# Patient Record
Sex: FEMALE | Race: WHITE
[De-identification: names, ages, dates, MRNs, and addresses within clinical notes are randomized per-mention and may not be internally consistent; named-entity substitution may affect disease eponyms.]

---

## 2017-11-29 VITALS — DIASTOLIC BLOOD PRESSURE: 71 MMHG | SYSTOLIC BLOOD PRESSURE: 114 MMHG

## 2017-11-29 LAB — PLATELET COUNT, AUTOMATED: 164 K/UL (ref 150–450)

## 2017-11-29 NOTE — ONC PROGRESS NOTE - NP.HALSEY
Patient History


Date of Service


2017





Reason For Visit/HPI


Patient is seen in the clinic today for follow-up of her breast cancer. Patient 

is currently a stage IV according to PET/CT scan and was started on ibrance 

with her Femara by Dr. Alexander Tuttle at [facility]. Patient started 

Ibrance in July and reports that she has had to decrease the dose due to 

neutropenia.  She is currently on cycle 4 and has 1 week of treatment to 

complete this cycle. She has required 2 week break prior to each dose 

reduction. She has been out of state for the last 3 months and is back in town. 

She is currently on 75 mg daily 3 weeks on a 4 week cycle. She currently is 

looking for a breast specialist oncology provider. She is not sure she wants to 

travel to see Dr. Alexander Tuttle on a monthly basis especially with the winter 

months. She is in need of labs today and then we will follow monthly with labs. 

She has not had any further testing done recently.





Oncology History


The patient is a 66-year-old female who presented with a mass of the right 

breast.  Her mammogram and ultrasound showed an area suspicious in the right 

breast upper outer quadrant.  The patient ended by having bilateral mastectomy 

and right axillary lymph node biopsy done on 2016.  The pathology 

came back positive for 2 cm invasive lobular carcinoma of the right breast, 

grade III/III, with 8/8 lymph nodes coming back positive for metastasis.  ER/ND 

positive, HER2/tiffanie negative.  Ki-67 was 84%.  CA 27-29 was normal at 8.  The 

patient had an echocardiogram on 2016, which showed left 

ventricular ejection fraction of 55%.  MRI of the brain done on 2016 was negative for intracranial metastasis and PET scan done the same day 

showed multiple small, scattered sclerotic non-hypermetabolic bone lesions; 

metastatic disease could not be fully ruled out.  The patient has been 

evaluated by Dr. Caitlin Bush at [facility] with recommendation 

of treatment with dose-dense AC to be followed by dose-dense Taxol and patient 

will start adjuvant hormonal therapy at the same time of Taxol to be followed 

by radiation therapy versus inclusion in a clinical trial.  The patient started 

adjuvant dose-dense AC with Adriamycin and cyclophosphamide on 2016.


She started dose dense adjuvant Taxol therapy on 2017.  





The patient completed four cycles of dose-dense Taxol on 2017.  





Radiation therapy was completed between 2017 and 2017 to the right 

breast and axilla to a therapeutic dose of 6040 cGy.





Repeat PET scan indicates stage IV disease. Patient had a 2nd biopsy which was 

inconclusive according to her. Patient was started on ibrance early in July 

with concurrent Femara.  Patient has required dose reduction due to 

neutropenia. She is currently on 75 mg daily





Medical History


Family History:  


FH: HTN (hypertension)


  MOTHER, , Age:91


FH: MI (myocardial infarction)


  MOTHER, , Age:91


FH: breast cancer


  Aunt


  Cousin


FH: glaucoma


  MOTHER, , Age:91


FH: lung cancer


  MOTHER, , Age:91


FH: macular degeneration


  MOTHER, , Age:91


FH: prostate cancer


  Cousin





Psychosocial History


Social History


She is single


Occupational History


She is retired


Alcohol History


She denies abuse


Smoking History:  No


Smoking Status:  Never Smoker


Exposure to Second Hand Smoke?:  No





Medications and Allergies


Active Scripts


Letrozole (FEMARA) 2.5 Mg Tab, 2.5 MG GT DAILY, #30 TAB 9 Refills


   Prov:CAITLIN NICE FNP-BC, ONC         17


Reported Medications


Glucosamine Sulfate 2KCL (GLUCOSAMINE) 1,000 Mg Tablet, 1000 MG PO


   17


Palbociclib (Ibrance) 75 Mg Capsule


   17


Ubidecarenone (CO Q-10) 10 Mg Capsule, 10 MG PO DAILY, CAPSULE


   17


Cyanocobalamin (Vitamin B-12) (VITAMIN B-12) 1,000 Mcg Tablet, 1000 MCG PO DAILY


   17


Vitamin B Complex (B COMPLEX) 1 Each Tablet, 1 EACH PO DAILY


   17


Phytonadione (VITAMIN K) 100 Mcg Tablet, 100 MCG PO DAILY


   17


Calcium Citrate/Vitamin D3 (CALCIUM CITRATE - VIT D TABLET) 1 Each Tablet, 2 

TAB PO DAILY, TAB


   11/15/16


Cholecalciferol (Vitamin D3) (VITAMIN D3) 5,000 Unit Tablet, 2 TAB PO DAILY, TAB


   11/15/16


[Areds 2]   No Conflict Check, 2 TAB PO DAILY for Eye Health


   11/15/16


Discontinued Reported Medications


Melatonin/Pyridoxine (MELATONIN 3 MG TABLET) 1 Each Tablet, 1 TAB PO QHS Y for 

INSOMNIA, TAB


   11/15/16


Discontinued Scripts


Lorazepam (ATIVAN) 0.5 Mg Tablet, 0.5 MG PO Q4-6H Y for NAUSEA, #10 TAB


   Prov:CAITLIN NICE FNP-BC, ONC         16


Ondansetron Hcl (ZOFRAN) 8 Mg Tablet, 8 MG PO Q8H, #10 TAB


   Prov:CAITLIN NICE FNP-BC, ONC         16


Allergies:  


Coded Allergies:  


     No Known Drug Allergies (Verified , 13)





Review of System/Physical Exam


Review of Systems


All Systems Reviewed/Normal:  Yes, Except as Noted


Hematologic:  Positive for Fatigue





Physical Exam


Vital Signs





Temperature:         98.6 


Pulse:                    70 


BP Systolic:           114 


BP Diastolic:          71 


Respiratory Rate:   16 


O2 SAT:                94 


O2 Delivery:            





Height (inches)      62.00  


Weight lb:             113 


Weight oz:             


Weight Kg (Iftikhar):     





Pain:                    0


ECOG Score:  0


General:  Stable, Well Developed, Well Nourished, Not In Acute Distress


Lungs:  Clear to Auscultation


Heart:  Regular Rate, Regular Rhythm, No Gallops


Abdomen:  Other (bowel sounds active)


Extremities:  No Cyanosis, No Edema


Psychiatric:  Mood appears normal, Affect appears normal, Other (exam was 

deferred today to discuss current treatment and management of therapy.)





Diagnostic Studies


Diagnostic Studies


Laboratory


CBC, CMP and vitamin D drawn today








Item Value  Date Time


 


White Blood Count 2.1 k/uL L 17 1345


 


Red Blood Count 3.78 M/uL L 17 1345


 


Mean Corpuscular Volume 101.2 fL H 17 1345


 


Mean Corpuscular Hemoglobin 35.0 pg H 17 1345


 


Neutrophils # (Auto) 1.2 K/uL L 17 1345


 


Vitamin D 25-Hydroxy 74 ng/ml 17 1345


 


Sodium Level 137 mmol/L 17 1345


 


Potassium Level 4.0 mmol/L 17 1345


 


Chloride Level 102 mmol/L 17 1345


 


Carbon Dioxide Level 25 mmol/L 17 1345


 


Blood Urea Nitrogen 15 mg/dl 17 1345


 


Creatinine 0.90 mg/dl 17 1345


 


Glomerular Filtration Rate Calc > 60.0 17 1345


 


Random Glucose 79 mg/dl 17 1345


 


Calcium Level 9.4 mg/dl 17 1345


 


Total Bilirubin 0.7 mg/dl 17 1345


 


Aspartate Amino Transf (AST/SGOT) 33 U/L 17 1345


 


Alanine Aminotransferase (ALT/SGPT) 39 U/L 17 1345


 


Alkaline Phosphatase 49 U/L 17 1345


 


Total Protein 7.1 gm/dl 17 1345


 


Albumin 4.2 g/dl 17 1345











Assessment and Plan


Assessment & Plan





1. Initial staging with consult was a Stage III right invasive lobular 

carcinoma of the right breast status post bilateral mastectomy with right 

axillary lymph node dissection done 2016 for 2 cm invasive lobular 

carcinoma grade 2/3 with 8 out of 8 lymph nodes positive for metastasis, ER/ND 

positive, HER2/tiffanie negative, Ki-67 8.4%.  MRI of the brain done 2016 

was negative for intracranial metastases, and PET scan showed multiple 

sclerotic nonhypermetabolic bone lesions.  Echocardiogram 2016 did 

reveal normal left ventricular ejection fraction of 55%.  Patient has been 

evaluated by Dr. Caitlin Bush at [facility], who recommended 

dose dense AC to be followed by dose dense Taxol, and patient completed 4 

cycles of dose dense AC between 2016 through 2017.  

After that, she received dose dense Taxol for 4 cycles between 2017 

through 2017 with concurrent hormonal therapy with Femara.  She 

completed her radiation therapy.  Her PET scan was really positive, but it was 

thought may be due to her Neulasta shot, but her repeated scan was also 

suggestive of osseous metastasis by radiologist report, although it looks the 

same like the previous scan.  Patient was seen by clinical trial staff and was 

not eligible. She consulted with Dr. Alexander Tuttle and completed a second 

biopsy of the left hip which was inconclusive again as the previous.  She was 

started on Ibrance in 2017 with Femara. Patient has required a dose 

reduction due to neutropenia. She has been followed by  Dr. Alexander Tuttle. 

She is currently on cycle 4 of ibrance 75 mg dose week 3 today. She tentatively 

will start cycle 5 on . She will need a new prescription through 

[facility].











2.  Sclerotic bone lesions, negative by PET scan twice.  Her bone scan is still 

positive, which was done recently on Vidya 15, 2017. She has discussed the use 

of Xgeva with Dr. Tuttle but has not been started on it. 





3. Vitamin D deficiency. Patient's vitamin D has improved on 10,000 units 

daily. level is at 74. 





Patient to  lab results later today for her records and to share with 

Dr. Tabatha Tuttle if she follows with him again.  She is considering 

establishing care with Dr. England. 




















I personally spent a total of 30 minutes. Of that 30 minutes was counseling/

coordination of patient's care.  


See my note above for details.


Copies to:   BRIE AUSTIN DNP, FNP-BC











CAITLIN NICE FNP-BC, ONC 2017 13:20

## 2017-12-13 VITALS — DIASTOLIC BLOOD PRESSURE: 61 MMHG | SYSTOLIC BLOOD PRESSURE: 93 MMHG

## 2017-12-13 LAB — PLATELET COUNT, AUTOMATED: 138 K/UL (ref 150–450)

## 2017-12-13 NOTE — ONC PROGRESS NOTE - NP.HALSEY
Patient History


Date of Service


Dec 13, 2017





Reason For Visit/HPI


Patient is seen in the clinic today for follow-up of her breast cancer. Patient 

is currently a stage IV according to PET/CT scan and was started on ibrance 

with her Femara by Dr. Alexander Tuttle at [facility]. Patient started 

Ibrance in July and reports that she has had to decrease the dose due to 

neutropenia.  She has completed cycle 4 .  Her absolute neutrophil count is 700 

today so she will wait one additional week, redraw labs and possibly start.  

She has required 2 week break prior to each previously and the dose reduction.  

She is currently on 75 mg daily 3 weeks on a 4 week cycle. She currently is 

looking for a breast specialist oncology provider. She is not sure she wants to 

travel to see Dr. Alexander Tuttle on a monthly basis especially with the winter 

months. She also thinks that maybe she will go back to [facility] however does 

not want to see them on a monthly basis. Patient reports that she has had Xgeva 

which was initiated in September by Dr. Alexander Tuttle. She does not believe 

that she has had a 2nd Xgeva since that time.





Problem List





(1) Hypercholesteremia


(2) Post-menopausal


(3) Metastatic breast cancer


(4) Estrogen receptor positive


(5) Chemotherapy induced nausea and vomiting


(6) Invasive lobular carcinoma of breast, stage 3


(7) Breast cancer, right





Oncology History


The patient is a 66-year-old female who presented with a mass of the right 

breast.  Her mammogram and ultrasound showed an area suspicious in the right 

breast upper outer quadrant.  The patient ended by having bilateral mastectomy 

and right axillary lymph node biopsy done on 2016.  The pathology 

came back positive for 2 cm invasive lobular carcinoma of the right breast, 

grade III/III, with 8/8 lymph nodes coming back positive for metastasis.  ER/AZ 

positive, HER2/tiffanie negative.  Ki-67 was 84%.  CA 27-29 was normal at 8.  The 

patient had an echocardiogram on 2016, which showed left 

ventricular ejection fraction of 55%.  MRI of the brain done on 2016 was negative for intracranial metastasis and PET scan done the same day 

showed multiple small, scattered sclerotic non-hypermetabolic bone lesions; 

metastatic disease could not be fully ruled out.  The patient has been 

evaluated by Dr. Caitlin Bush at [facility] with recommendation 

of treatment with dose-dense AC to be followed by dose-dense Taxol and patient 

will start adjuvant hormonal therapy at the same time of Taxol to be followed 

by radiation therapy versus inclusion in a clinical trial.  The patient started 

adjuvant dose-dense AC with Adriamycin and cyclophosphamide on 2016.


She started dose dense adjuvant Taxol therapy on 2017.  





The patient completed four cycles of dose-dense Taxol on 2017.  





Radiation therapy was completed between 2017 and 2017 to the right 

breast and axilla to a therapeutic dose of 6040 cGy.





Repeat PET scan indicates stage IV disease. Patient had a 2nd biopsy which was 

inconclusive according to her. Patient was started on ibrance early in July 

with concurrent Femara.  Patient has required dose reduction due to 

neutropenia. She is currently on 75 mg daily





Patient reports that Dr. Alexander Tuttle started her on Xgeva 120 mg in 

September but she has not had a 2nd dose due to her traveling. I will 

reinitiate this today





Medical History


Family History:  


FH: HTN (hypertension)


  MOTHER, , Age:91


FH: MI (myocardial infarction)


  MOTHER, , Age:91


FH: breast cancer


  Aunt


  Cousin


FH: glaucoma


  MOTHER, , Age:91


FH: lung cancer


  MOTHER, , Age:91


FH: macular degeneration


  MOTHER, , Age:91


FH: prostate cancer


  Cousin





Psychosocial History


Social History


She is single


Occupational History


She is retired


Alcohol History


She denies abuse


Smoking History:  No


Smoking Status:  Never Smoker


Exposure to Second Hand Smoke?:  No





Medications and Allergies


Active Scripts


Letrozole (FEMARA) 2.5 Mg Tab, 2.5 MG GT DAILY, #30 TAB 9 Refills


   Prov:CAITLIN NICE FNP-BC, ONC         17


Reported Medications


[tumeric]   No Conflict Check, PO


   17


Glucosamine Sulfate 2KCL (GLUCOSAMINE) 1,000 Mg Tablet, 1000 MG PO


   17


Palbociclib (Ibrance) 75 Mg Capsule


   17


Ubidecarenone (CO Q-10) 10 Mg Capsule, 10 MG PO DAILY, CAPSULE


   17


Cyanocobalamin (Vitamin B-12) (VITAMIN B-12) 1,000 Mcg Tablet, 1000 MCG PO DAILY


   17


Vitamin B Complex (B COMPLEX) 1 Each Tablet, 1 EACH PO DAILY


   17


Phytonadione (VITAMIN K) 100 Mcg Tablet, 100 MCG PO DAILY


   17


Calcium Citrate/Vitamin D3 (CALCIUM CITRATE - VIT D TABLET) 1 Each Tablet, 2 

TAB PO DAILY, TAB


   11/15/16


Cholecalciferol (Vitamin D3) (VITAMIN D3) 5,000 Unit Tablet, 2 TAB PO DAILY, TAB


   11/15/16


[Areds 2]   No Conflict Check, 2 TAB PO DAILY for Eye Health


   11/15/16


Allergies:  


Coded Allergies:  


     No Known Drug Allergies (Verified , 13)





Review of System/Physical Exam


Review of Systems


All Systems Reviewed/Normal:  Yes, Except as Noted


Hematologic:  Positive for Fatigue


Psychiatric:  Depression (patient is very angry and frustrated with her current 

diagnosis and does not see a positive side most days regarding this diagnosis.)





Physical Exam


Vital Signs





Temperature:         98.9 


Pulse:                    70 


BP Systolic:           93 


BP Diastolic:          61 


Respiratory Rate:   16 


O2 SAT:                95 


O2 Delivery:            





Height (inches)      62.00  


Weight lb:             113 


Weight oz:             


Weight Kg (Iftikhar):     





Pain:                    0


ECOG Score:  0


General:  Stable, Well Developed, Well Nourished, Not In Acute Distress


Heart:  Regular Rate, Regular Rhythm, No Gallops


Abdomen:  Soft and Nontender, No Hepatosplenomegaly, No Masses


Psychiatric:  Mood appears normal, Affect appears normal (normal regarding 

patient's current diagnosis.)





Diagnostic Studies


Diagnostic Studies


Laboratory


 Laboratory Tests


17 09:00








Laboratory Tests


17 13:45: Vitamin D 25-Hydroxy 74


17 09:00: 


White Blood Count 1.7, Red Blood Count 3.74, Hemoglobin 13.0, Hematocrit 37.5, 

Mean Corpuscular Volume 100.3, Mean Corpuscular Hemoglobin 34.9, Mean 

Corpuscular Hemoglobin Concent 34.8, Red Cell Distribution Width 14.7, Platelet 

Count 138, Mean Platelet Volume 7.8, Neutrophils (%) (Auto) 40.6, Lymphocytes (%

) (Auto) 31.4, Monocytes (%) (Auto) 21.5, Eosinophils (%) (Auto) 4.9, Basophils 

(%) (Auto) 1.6, Nucleated RBC Relative Count (auto) 0.1, Neutrophils # (Auto) 

0.7, Lymphocytes # (Auto) 0.5, Monocytes # (Auto) 0.4, Eosinophils # (Auto) 0.1

, Basophils # (Auto) 0.0, Nucleated RBC Absolute Count (auto) 0.00, Peripheral 

Blood Smear Yes, Sodium Level 140, Potassium Level 4.3, Chloride Level 107, 

Carbon Dioxide Level 26, Blood Urea Nitrogen 14, Creatinine 0.70, Glomerular 

Filtration Rate Calc > 60.0, Random Glucose 87, Calcium Level 8.7, Total 

Bilirubin 0.3, Aspartate Amino Transf (AST/SGOT) 32, Alanine Aminotransferase (

ALT/SGPT) 35, Alkaline Phosphatase 40, Total Protein 6.6, Albumin 3.8





Assessment and Plan


Assessment & Plan





1. Initial staging with consult was a Stage III right invasive lobular 

carcinoma of the right breast status post bilateral mastectomy with right 

axillary lymph node dissection done 2016 for 2 cm invasive lobular 

carcinoma grade 2/3 with 8 out of 8 lymph nodes positive for metastasis, ER/AZ 

positive, HER2/tiffanie negative, Ki-67 8.4%.  MRI of the brain done 2016 

was negative for intracranial metastases, and PET scan showed multiple 

sclerotic nonhypermetabolic bone lesions.  Echocardiogram 2016 did 

reveal normal left ventricular ejection fraction of 55%.  Patient has been 

evaluated by Dr. Caitlin Bush at [facility], who recommended 

dose dense AC to be followed by dose dense Taxol, and patient completed 4 

cycles of dose dense AC between 2016 through 2017.  

After that, she received dose dense Taxol for 4 cycles between 2017 

through 2017 with concurrent hormonal therapy with Femara.  She 

completed her radiation therapy.  Her PET scan was really positive, but it was 

thought may be due to her Neulasta shot, but her repeated scan was also 

suggestive of osseous metastasis by radiologist report, although it looks the 

same like the previous scan.  Patient was seen by clinical trial staff and was 

not eligible. She consulted with Dr. Alexander Tuttle and completed a second 

biopsy of the left hip which was inconclusive again as the previous.  She was 

started on Ibrance in 2017 with Femara. Patient has required a dose 

reduction due to neutropenia. She has been followed by  Dr. Alexander Tuttle. 

She has just completed cycle 4 of ibrance 75 mg dose week. Her ANC is 700 today 

so I will hold an additional week and repeat labs. If ANC is 1000 or greater 

she will restart Ibrance next week. She tentatively will start cycle 5 on 

 at the 75 mg dose. Patient also reports that she was started on 

Xgeva 120 mg in September and has not had a 2nd dose. I will restart this on a 

monthly basis next week. Patient will also have a PET/CT scan completed in 

January prior to starting cycle 6 of current treatment for evaluation.





Patient is encouraged to visit with [facility] and see if they would continue 

to monitor her care on a 3-4 month basis. She may follow with Dr. Zaidi in 

the [place] clinic. She may seek care by a new provider. She is not sure if she 

wants to continue following with Dr. Alexander Tuttle as he is a research 

specialist. He is not currently on a clinical trial.








2.  Sclerotic bone lesions, negative by PET scan twice.  Her bone scan is still 

positive, which was done recently on Vidya 15, 2017.  I will repeat a PET CT 

scan in January.  





3. Vitamin D deficiency. Patient's vitamin D has improved on 10,000 units 

daily. level is at 74. 




















I personally spent a total of 30 minutes. Of that 30 minutes was counseling/

coordination of patient's care.  


See my note above for details.











CAITLIN NICE FNP-BC, ONC Dec 13, 2017 12:04

## 2017-12-20 LAB — PLATELET COUNT, AUTOMATED: 204 K/UL (ref 150–450)

## 2017-12-22 ENCOUNTER — HOSPITAL ENCOUNTER (OUTPATIENT)
Dept: HOSPITAL 89 - SPU | Age: 67
LOS: 28 days | Discharge: HOME | End: 2018-01-19
Attending: NURSE PRACTITIONER
Payer: MEDICARE

## 2017-12-22 VITALS — WEIGHT: 114.79 LBS | BODY MASS INDEX: 21.12 KG/M2 | HEIGHT: 62 IN

## 2017-12-22 DIAGNOSIS — C50.911: Primary | ICD-10-CM

## 2017-12-22 DIAGNOSIS — C77.9: ICD-10-CM

## 2017-12-22 DIAGNOSIS — R11.2: ICD-10-CM

## 2017-12-22 DIAGNOSIS — Z92.3: ICD-10-CM

## 2017-12-22 DIAGNOSIS — M89.9: ICD-10-CM

## 2017-12-22 DIAGNOSIS — E55.9: ICD-10-CM

## 2017-12-22 DIAGNOSIS — Z17.0: ICD-10-CM

## 2017-12-22 DIAGNOSIS — Z78.0: ICD-10-CM

## 2017-12-22 DIAGNOSIS — R53.83: ICD-10-CM

## 2017-12-22 DIAGNOSIS — Z79.899: ICD-10-CM

## 2017-12-22 DIAGNOSIS — E78.00: ICD-10-CM

## 2017-12-22 DIAGNOSIS — Z92.21: ICD-10-CM

## 2017-12-22 LAB — PLATELET COUNT, AUTOMATED: 227 K/UL (ref 150–450)

## 2017-12-22 PROCEDURE — 83735 ASSAY OF MAGNESIUM: CPT

## 2017-12-22 PROCEDURE — 82040 ASSAY OF SERUM ALBUMIN: CPT

## 2017-12-22 PROCEDURE — 99212 OFFICE O/P EST SF 10 MIN: CPT

## 2017-12-22 PROCEDURE — 84460 ALANINE AMINO (ALT) (SGPT): CPT

## 2017-12-22 PROCEDURE — 36415 COLL VENOUS BLD VENIPUNCTURE: CPT

## 2017-12-22 PROCEDURE — 82310 ASSAY OF CALCIUM: CPT

## 2017-12-22 PROCEDURE — 84295 ASSAY OF SERUM SODIUM: CPT

## 2017-12-22 PROCEDURE — 84100 ASSAY OF PHOSPHORUS: CPT

## 2017-12-22 PROCEDURE — 84520 ASSAY OF UREA NITROGEN: CPT

## 2017-12-22 PROCEDURE — 86300 IMMUNOASSAY TUMOR CA 15-3: CPT

## 2017-12-22 PROCEDURE — 84450 TRANSFERASE (AST) (SGOT): CPT

## 2017-12-22 PROCEDURE — 96372 THER/PROPH/DIAG INJ SC/IM: CPT

## 2017-12-22 PROCEDURE — 82374 ASSAY BLOOD CARBON DIOXIDE: CPT

## 2017-12-22 PROCEDURE — 84132 ASSAY OF SERUM POTASSIUM: CPT

## 2017-12-22 PROCEDURE — 86304 IMMUNOASSAY TUMOR CA 125: CPT

## 2017-12-22 PROCEDURE — 82565 ASSAY OF CREATININE: CPT

## 2017-12-22 PROCEDURE — 82947 ASSAY GLUCOSE BLOOD QUANT: CPT

## 2017-12-22 PROCEDURE — 84075 ASSAY ALKALINE PHOSPHATASE: CPT

## 2017-12-22 PROCEDURE — 84155 ASSAY OF PROTEIN SERUM: CPT

## 2017-12-22 PROCEDURE — 82306 VITAMIN D 25 HYDROXY: CPT

## 2017-12-22 PROCEDURE — 82247 BILIRUBIN TOTAL: CPT

## 2017-12-22 PROCEDURE — 85025 COMPLETE CBC W/AUTO DIFF WBC: CPT

## 2017-12-22 PROCEDURE — 82378 CARCINOEMBRYONIC ANTIGEN: CPT

## 2017-12-22 PROCEDURE — 82435 ASSAY OF BLOOD CHLORIDE: CPT

## 2018-01-18 LAB — PLATELET COUNT, AUTOMATED: 146 K/UL (ref 150–450)

## 2018-01-24 VITALS — DIASTOLIC BLOOD PRESSURE: 80 MMHG | SYSTOLIC BLOOD PRESSURE: 132 MMHG

## 2018-02-15 ENCOUNTER — HOSPITAL ENCOUNTER (OUTPATIENT)
Dept: HOSPITAL 89 - SPU | Age: 68
LOS: 6 days | Discharge: HOME | End: 2018-02-21
Attending: NURSE PRACTITIONER
Payer: MEDICARE

## 2018-02-15 VITALS — DIASTOLIC BLOOD PRESSURE: 63 MMHG | SYSTOLIC BLOOD PRESSURE: 99 MMHG

## 2018-02-15 DIAGNOSIS — C50.911: Primary | ICD-10-CM

## 2018-02-15 LAB — PLATELET COUNT, AUTOMATED: 149 K/UL (ref 150–450)

## 2018-02-15 PROCEDURE — 82247 BILIRUBIN TOTAL: CPT

## 2018-02-15 PROCEDURE — 84450 TRANSFERASE (AST) (SGOT): CPT

## 2018-02-15 PROCEDURE — 84520 ASSAY OF UREA NITROGEN: CPT

## 2018-02-15 PROCEDURE — 84155 ASSAY OF PROTEIN SERUM: CPT

## 2018-02-15 PROCEDURE — 82947 ASSAY GLUCOSE BLOOD QUANT: CPT

## 2018-02-15 PROCEDURE — 96372 THER/PROPH/DIAG INJ SC/IM: CPT

## 2018-02-15 PROCEDURE — 82435 ASSAY OF BLOOD CHLORIDE: CPT

## 2018-02-15 PROCEDURE — 84132 ASSAY OF SERUM POTASSIUM: CPT

## 2018-02-15 PROCEDURE — 85025 COMPLETE CBC W/AUTO DIFF WBC: CPT

## 2018-02-15 PROCEDURE — 82374 ASSAY BLOOD CARBON DIOXIDE: CPT

## 2018-02-15 PROCEDURE — 82040 ASSAY OF SERUM ALBUMIN: CPT

## 2018-02-15 PROCEDURE — 84295 ASSAY OF SERUM SODIUM: CPT

## 2018-02-15 PROCEDURE — 84075 ASSAY ALKALINE PHOSPHATASE: CPT

## 2018-02-15 PROCEDURE — 36415 COLL VENOUS BLD VENIPUNCTURE: CPT

## 2018-02-15 PROCEDURE — 82310 ASSAY OF CALCIUM: CPT

## 2018-02-15 PROCEDURE — 82565 ASSAY OF CREATININE: CPT

## 2018-02-15 PROCEDURE — 84460 ALANINE AMINO (ALT) (SGPT): CPT

## 2018-03-01 ENCOUNTER — HOSPITAL ENCOUNTER (OUTPATIENT)
Dept: HOSPITAL 89 - OR | Age: 68
Discharge: HOME | End: 2018-03-01
Attending: SURGERY
Payer: MEDICARE

## 2018-03-01 VITALS — SYSTOLIC BLOOD PRESSURE: 109 MMHG | DIASTOLIC BLOOD PRESSURE: 74 MMHG

## 2018-03-01 VITALS — DIASTOLIC BLOOD PRESSURE: 83 MMHG | SYSTOLIC BLOOD PRESSURE: 107 MMHG

## 2018-03-01 VITALS — SYSTOLIC BLOOD PRESSURE: 99 MMHG | DIASTOLIC BLOOD PRESSURE: 75 MMHG

## 2018-03-01 VITALS — DIASTOLIC BLOOD PRESSURE: 76 MMHG | SYSTOLIC BLOOD PRESSURE: 108 MMHG

## 2018-03-01 VITALS — WEIGHT: 110 LBS | HEIGHT: 62 IN | BODY MASS INDEX: 20.24 KG/M2

## 2018-03-01 VITALS — DIASTOLIC BLOOD PRESSURE: 56 MMHG | SYSTOLIC BLOOD PRESSURE: 86 MMHG

## 2018-03-01 VITALS — SYSTOLIC BLOOD PRESSURE: 90 MMHG | DIASTOLIC BLOOD PRESSURE: 61 MMHG

## 2018-03-01 VITALS — DIASTOLIC BLOOD PRESSURE: 67 MMHG | SYSTOLIC BLOOD PRESSURE: 101 MMHG

## 2018-03-01 DIAGNOSIS — Z12.11: Primary | ICD-10-CM

## 2018-03-01 DIAGNOSIS — K57.30: ICD-10-CM

## 2018-03-01 PROCEDURE — 00812 ANES LWR INTST SCR COLSC: CPT

## 2018-03-01 NOTE — POST OPERATIVE PROGRESS NOTE
Post Operative Progress Note


Date:  Mar 1, 2018


Time:  08:49


Surgeon:  


helena


Anesthesia:  


dr dos santos


Pre-Op Diagnosis:  


personal history of polyps


Post-Op Diagnosis:  


sigmoid diverticulosis


Procedure(s):  


colonoscopy











CALLY HIDALGO MD Mar 1, 2018 06:29

## 2018-03-01 NOTE — OPERATIVE REPORT 1
EVENT DATE:  March 1, 2018

SURGEON:  Bashir Ralph MD

ANESTHESIOLOGIST:  Gio Rooney MD

ANESTHESIA:  Sedation.





PREOPERATIVE DIAGNOSIS  

Personal history of polyps.



POSTOPERATIVE DIAGNOSIS 

Diverticulosis.



DESCRIPTION OF PROCEDURE 

The patient was placed in the left lateral decubitus position and given 
intravenous sedation.  The rectal exam was unremarkable.  A flexible 
colonoscope was inserted and advanced to the cecum.  She had an excellent bowel 
prep.  Ileocecal valve and the terminal ileum were identified.  The scope was 
slowly withdrawn.  Care was taken to look behind the haustral folds.  No 
abnormalities were noted in the cecum, right colon, or transverse colon.  In 
the descending colon and sigmoid colon, she had several diverticula.  No 
evidence of diverticulitis.  The rectum was normal.  The scope was retroflexed.
  That appeared to be normal.  



The patient will require a repeat colonoscopy in five years because of her 
history of polyps.  
MTDLEIA

## 2018-03-01 NOTE — SHORT(OUTPT) DISCHARGE SUMMARY
Discharge Summary


Reason for Hosp/Final Diag:  


(1) Encounter for colonoscopy due to history of adenomatous colonic polyps


Hospital Course & Plan:  sigmoid diverticulosis





Departure


Discharge to:  Home





Discharge Instructions


Home Meds


Active Scripts


Letrozole (FEMARA) 2.5 Mg Tab, 2.5 MG GT DAILY, #30 TAB 9 Refills


   Prov:ROMÁN NICE DARLING FNP-BC, ONC         1/23/17


Reported Medications


Lutein (LUTEIN) 20 Mg Tablet, 20 MG PO


   2/19/18


[tumeric]   No Conflict Check, PO


   12/13/17


Glucosamine Sulfate 2KCL (GLUCOSAMINE) 1,000 Mg Tablet, 1000 MG PO


   11/29/17


Palbociclib (Ibrance) 75 Mg Capsule


   11/29/17


Ubidecarenone (CO Q-10) 10 Mg Capsule, 10 MG PO DAILY, CAPSULE


   2/16/17


Cyanocobalamin (Vitamin B-12) (VITAMIN B-12) 1,000 Mcg Tablet, 1000 MCG PO DAILY


   2/16/17


Vitamin B Complex (B COMPLEX) 1 Each Tablet, 1 EACH PO DAILY


   2/16/17


Phytonadione (VITAMIN K) 100 Mcg Tablet, 100 MCG PO DAILY


   2/16/17


Calcium Citrate/Vitamin D3 (CALCIUM CITRATE - VIT D TABLET) 1 Each Tablet, 2 

TAB PO DAILY, TAB


   11/15/16


Cholecalciferol (Vitamin D3) (VITAMIN D3) 5,000 Unit Tablet, 2 TAB PO DAILY, TAB


   11/15/16


Diet:  High Fiber


Activity:  As Tolerated











CALLY HIDALGO MD Mar 1, 2018 06:30

## 2018-03-19 ENCOUNTER — HOSPITAL ENCOUNTER (OUTPATIENT)
Dept: HOSPITAL 89 - SPU | Age: 68
End: 2018-03-19
Attending: INTERNAL MEDICINE
Payer: MEDICARE

## 2018-03-19 VITALS — SYSTOLIC BLOOD PRESSURE: 105 MMHG | DIASTOLIC BLOOD PRESSURE: 69 MMHG

## 2018-03-19 DIAGNOSIS — C79.51: ICD-10-CM

## 2018-03-19 DIAGNOSIS — C50.411: Primary | ICD-10-CM

## 2018-03-19 DIAGNOSIS — Z17.0: ICD-10-CM

## 2018-03-19 LAB — PLATELET COUNT, AUTOMATED: 111 K/UL (ref 150–450)

## 2018-03-19 PROCEDURE — 36415 COLL VENOUS BLD VENIPUNCTURE: CPT

## 2018-04-20 ENCOUNTER — HOSPITAL ENCOUNTER (OUTPATIENT)
Dept: HOSPITAL 89 - SPU | Age: 68
LOS: 7 days | Discharge: HOME | End: 2018-04-27
Attending: NURSE PRACTITIONER
Payer: MEDICARE

## 2018-04-20 ENCOUNTER — HOSPITAL ENCOUNTER (OUTPATIENT)
Dept: HOSPITAL 89 - SPU | Age: 68
End: 2018-04-20
Attending: NURSE PRACTITIONER
Payer: MEDICARE

## 2018-04-20 VITALS — DIASTOLIC BLOOD PRESSURE: 61 MMHG | SYSTOLIC BLOOD PRESSURE: 91 MMHG

## 2018-04-20 DIAGNOSIS — C50.911: Primary | ICD-10-CM

## 2018-04-20 LAB — PLATELET COUNT, AUTOMATED: 132 K/UL (ref 150–450)

## 2018-04-20 PROCEDURE — 82040 ASSAY OF SERUM ALBUMIN: CPT

## 2018-04-20 PROCEDURE — 82247 BILIRUBIN TOTAL: CPT

## 2018-04-20 PROCEDURE — 84155 ASSAY OF PROTEIN SERUM: CPT

## 2018-04-20 PROCEDURE — 84450 TRANSFERASE (AST) (SGOT): CPT

## 2018-04-20 PROCEDURE — 82565 ASSAY OF CREATININE: CPT

## 2018-04-20 PROCEDURE — 82947 ASSAY GLUCOSE BLOOD QUANT: CPT

## 2018-04-20 PROCEDURE — 84295 ASSAY OF SERUM SODIUM: CPT

## 2018-04-20 PROCEDURE — 36415 COLL VENOUS BLD VENIPUNCTURE: CPT

## 2018-04-20 PROCEDURE — 82378 CARCINOEMBRYONIC ANTIGEN: CPT

## 2018-04-20 PROCEDURE — 82310 ASSAY OF CALCIUM: CPT

## 2018-04-20 PROCEDURE — 83735 ASSAY OF MAGNESIUM: CPT

## 2018-04-20 PROCEDURE — 86300 IMMUNOASSAY TUMOR CA 15-3: CPT

## 2018-04-20 PROCEDURE — 82435 ASSAY OF BLOOD CHLORIDE: CPT

## 2018-04-20 PROCEDURE — 84460 ALANINE AMINO (ALT) (SGPT): CPT

## 2018-04-20 PROCEDURE — 86304 IMMUNOASSAY TUMOR CA 125: CPT

## 2018-04-20 PROCEDURE — 82374 ASSAY BLOOD CARBON DIOXIDE: CPT

## 2018-04-20 PROCEDURE — 84132 ASSAY OF SERUM POTASSIUM: CPT

## 2018-04-20 PROCEDURE — 85025 COMPLETE CBC W/AUTO DIFF WBC: CPT

## 2018-04-20 PROCEDURE — 84520 ASSAY OF UREA NITROGEN: CPT

## 2018-04-20 PROCEDURE — 84075 ASSAY ALKALINE PHOSPHATASE: CPT

## 2018-04-20 PROCEDURE — 84100 ASSAY OF PHOSPHORUS: CPT

## 2018-04-20 PROCEDURE — 96372 THER/PROPH/DIAG INJ SC/IM: CPT

## 2018-08-09 ENCOUNTER — HOSPITAL ENCOUNTER (OUTPATIENT)
Dept: HOSPITAL 89 - SPU | Age: 68
LOS: 22 days | Discharge: HOME | End: 2018-08-31
Attending: INTERNAL MEDICINE
Payer: MEDICARE

## 2018-08-09 VITALS — SYSTOLIC BLOOD PRESSURE: 97 MMHG | DIASTOLIC BLOOD PRESSURE: 69 MMHG

## 2018-08-09 DIAGNOSIS — Z17.0: ICD-10-CM

## 2018-08-09 DIAGNOSIS — C79.51: ICD-10-CM

## 2018-08-09 DIAGNOSIS — C50.411: Primary | ICD-10-CM

## 2018-08-09 DIAGNOSIS — E83.42: ICD-10-CM

## 2018-08-09 LAB — PLATELET COUNT, AUTOMATED: 124 K/UL (ref 150–450)

## 2018-08-09 PROCEDURE — 84450 TRANSFERASE (AST) (SGOT): CPT

## 2018-08-09 PROCEDURE — 84132 ASSAY OF SERUM POTASSIUM: CPT

## 2018-08-09 PROCEDURE — 84155 ASSAY OF PROTEIN SERUM: CPT

## 2018-08-09 PROCEDURE — 84520 ASSAY OF UREA NITROGEN: CPT

## 2018-08-09 PROCEDURE — 82374 ASSAY BLOOD CARBON DIOXIDE: CPT

## 2018-08-09 PROCEDURE — 82565 ASSAY OF CREATININE: CPT

## 2018-08-09 PROCEDURE — 84100 ASSAY OF PHOSPHORUS: CPT

## 2018-08-09 PROCEDURE — 36415 COLL VENOUS BLD VENIPUNCTURE: CPT

## 2018-08-09 PROCEDURE — 84460 ALANINE AMINO (ALT) (SGPT): CPT

## 2018-08-09 PROCEDURE — 84075 ASSAY ALKALINE PHOSPHATASE: CPT

## 2018-08-09 PROCEDURE — 82040 ASSAY OF SERUM ALBUMIN: CPT

## 2018-08-09 PROCEDURE — 82947 ASSAY GLUCOSE BLOOD QUANT: CPT

## 2018-08-09 PROCEDURE — 82247 BILIRUBIN TOTAL: CPT

## 2018-08-09 PROCEDURE — 82435 ASSAY OF BLOOD CHLORIDE: CPT

## 2018-08-09 PROCEDURE — 85025 COMPLETE CBC W/AUTO DIFF WBC: CPT

## 2018-08-09 PROCEDURE — 83735 ASSAY OF MAGNESIUM: CPT

## 2018-08-09 PROCEDURE — 84295 ASSAY OF SERUM SODIUM: CPT

## 2018-08-09 PROCEDURE — 82310 ASSAY OF CALCIUM: CPT

## 2018-09-05 ENCOUNTER — HOSPITAL ENCOUNTER (OUTPATIENT)
Dept: HOSPITAL 89 - SPU | Age: 68
LOS: 21 days | Discharge: HOME | End: 2018-09-26
Attending: INTERNAL MEDICINE
Payer: MEDICARE

## 2018-09-05 VITALS — DIASTOLIC BLOOD PRESSURE: 68 MMHG | SYSTOLIC BLOOD PRESSURE: 101 MMHG

## 2018-09-05 DIAGNOSIS — Z17.0: ICD-10-CM

## 2018-09-05 DIAGNOSIS — C79.51: ICD-10-CM

## 2018-09-05 DIAGNOSIS — C50.411: Primary | ICD-10-CM

## 2018-09-05 PROCEDURE — 96372 THER/PROPH/DIAG INJ SC/IM: CPT

## 2018-09-07 ENCOUNTER — HOSPITAL ENCOUNTER (OUTPATIENT)
Dept: HOSPITAL 89 - SPU | Age: 68
End: 2018-09-07
Attending: INTERNAL MEDICINE
Payer: MEDICARE

## 2018-09-07 VITALS — DIASTOLIC BLOOD PRESSURE: 64 MMHG | SYSTOLIC BLOOD PRESSURE: 97 MMHG

## 2018-09-07 DIAGNOSIS — Z17.0: ICD-10-CM

## 2018-09-07 DIAGNOSIS — C79.51: ICD-10-CM

## 2018-09-07 DIAGNOSIS — E83.42: ICD-10-CM

## 2018-09-07 DIAGNOSIS — C50.411: Primary | ICD-10-CM

## 2018-09-07 LAB — PLATELET COUNT, AUTOMATED: 130 K/UL (ref 150–450)

## 2018-09-07 PROCEDURE — 82040 ASSAY OF SERUM ALBUMIN: CPT

## 2018-09-07 PROCEDURE — 84132 ASSAY OF SERUM POTASSIUM: CPT

## 2018-09-07 PROCEDURE — 82247 BILIRUBIN TOTAL: CPT

## 2018-09-07 PROCEDURE — 83735 ASSAY OF MAGNESIUM: CPT

## 2018-09-07 PROCEDURE — 84155 ASSAY OF PROTEIN SERUM: CPT

## 2018-09-07 PROCEDURE — 82947 ASSAY GLUCOSE BLOOD QUANT: CPT

## 2018-09-07 PROCEDURE — 82310 ASSAY OF CALCIUM: CPT

## 2018-09-07 PROCEDURE — 84075 ASSAY ALKALINE PHOSPHATASE: CPT

## 2018-09-07 PROCEDURE — 36415 COLL VENOUS BLD VENIPUNCTURE: CPT

## 2018-09-07 PROCEDURE — 84295 ASSAY OF SERUM SODIUM: CPT

## 2018-09-07 PROCEDURE — 85025 COMPLETE CBC W/AUTO DIFF WBC: CPT

## 2018-09-07 PROCEDURE — 82374 ASSAY BLOOD CARBON DIOXIDE: CPT

## 2018-09-07 PROCEDURE — 84460 ALANINE AMINO (ALT) (SGPT): CPT

## 2018-09-07 PROCEDURE — 84100 ASSAY OF PHOSPHORUS: CPT

## 2018-09-07 PROCEDURE — 82435 ASSAY OF BLOOD CHLORIDE: CPT

## 2018-09-07 PROCEDURE — 84450 TRANSFERASE (AST) (SGOT): CPT

## 2018-09-07 PROCEDURE — 82565 ASSAY OF CREATININE: CPT

## 2018-09-07 PROCEDURE — 84520 ASSAY OF UREA NITROGEN: CPT

## 2018-11-30 VITALS — DIASTOLIC BLOOD PRESSURE: 76 MMHG | SYSTOLIC BLOOD PRESSURE: 118 MMHG

## 2018-11-30 LAB — PLATELET COUNT, AUTOMATED: 136 K/UL (ref 150–450)

## 2018-12-28 LAB — PLATELET COUNT, AUTOMATED: 139 K/UL (ref 150–450)

## 2019-01-10 NOTE — NUR
LEFT MESSAGE FOR PT TO RTN CALL. PT NEEDS TO SCHEDULE LAB DRAW PER DR. WEBER AROUND 1/25/19. 
---------- VONDA ZAPATA RN

## 2019-01-25 VITALS — DIASTOLIC BLOOD PRESSURE: 75 MMHG | SYSTOLIC BLOOD PRESSURE: 105 MMHG

## 2019-01-25 LAB — PLATELET COUNT, AUTOMATED: 156 K/UL (ref 150–450)

## 2019-02-22 ENCOUNTER — HOSPITAL ENCOUNTER (OUTPATIENT)
Dept: HOSPITAL 89 - SPU | Age: 69
LOS: 5 days | End: 2019-02-27
Attending: INTERNAL MEDICINE
Payer: MEDICARE

## 2019-02-22 VITALS — SYSTOLIC BLOOD PRESSURE: 108 MMHG | DIASTOLIC BLOOD PRESSURE: 77 MMHG

## 2019-02-22 DIAGNOSIS — Z17.0: ICD-10-CM

## 2019-02-22 DIAGNOSIS — C79.51: ICD-10-CM

## 2019-02-22 DIAGNOSIS — C50.411: Primary | ICD-10-CM

## 2019-02-22 LAB — PLATELET COUNT, AUTOMATED: 135 K/UL (ref 150–450)

## 2019-02-22 PROCEDURE — 84132 ASSAY OF SERUM POTASSIUM: CPT

## 2019-02-22 PROCEDURE — 36415 COLL VENOUS BLD VENIPUNCTURE: CPT

## 2019-02-22 PROCEDURE — 82565 ASSAY OF CREATININE: CPT

## 2019-02-22 PROCEDURE — 82947 ASSAY GLUCOSE BLOOD QUANT: CPT

## 2019-02-22 PROCEDURE — 84295 ASSAY OF SERUM SODIUM: CPT

## 2019-02-22 PROCEDURE — 82374 ASSAY BLOOD CARBON DIOXIDE: CPT

## 2019-02-22 PROCEDURE — 82607 VITAMIN B-12: CPT

## 2019-02-22 PROCEDURE — 84155 ASSAY OF PROTEIN SERUM: CPT

## 2019-02-22 PROCEDURE — 96372 THER/PROPH/DIAG INJ SC/IM: CPT

## 2019-02-22 PROCEDURE — 84460 ALANINE AMINO (ALT) (SGPT): CPT

## 2019-02-22 PROCEDURE — 82435 ASSAY OF BLOOD CHLORIDE: CPT

## 2019-02-22 PROCEDURE — 82040 ASSAY OF SERUM ALBUMIN: CPT

## 2019-02-22 PROCEDURE — 82247 BILIRUBIN TOTAL: CPT

## 2019-02-22 PROCEDURE — 84075 ASSAY ALKALINE PHOSPHATASE: CPT

## 2019-02-22 PROCEDURE — 84100 ASSAY OF PHOSPHORUS: CPT

## 2019-02-22 PROCEDURE — 82310 ASSAY OF CALCIUM: CPT

## 2019-02-22 PROCEDURE — 84450 TRANSFERASE (AST) (SGOT): CPT

## 2019-02-22 PROCEDURE — 84520 ASSAY OF UREA NITROGEN: CPT

## 2019-02-22 PROCEDURE — 83735 ASSAY OF MAGNESIUM: CPT

## 2019-02-22 PROCEDURE — 85025 COMPLETE CBC W/AUTO DIFF WBC: CPT

## 2019-03-22 ENCOUNTER — HOSPITAL ENCOUNTER (OUTPATIENT)
Dept: HOSPITAL 89 - SPU | Age: 69
End: 2019-03-22
Attending: INTERNAL MEDICINE
Payer: MEDICARE

## 2019-03-22 VITALS — DIASTOLIC BLOOD PRESSURE: 56 MMHG | SYSTOLIC BLOOD PRESSURE: 97 MMHG

## 2019-03-22 DIAGNOSIS — E83.42: ICD-10-CM

## 2019-03-22 DIAGNOSIS — C79.51: ICD-10-CM

## 2019-03-22 DIAGNOSIS — C50.411: Primary | ICD-10-CM

## 2019-03-22 DIAGNOSIS — Z17.0: ICD-10-CM

## 2019-03-22 LAB — PLATELET COUNT, AUTOMATED: 133 K/UL (ref 150–450)

## 2019-03-22 PROCEDURE — 85025 COMPLETE CBC W/AUTO DIFF WBC: CPT

## 2019-03-22 PROCEDURE — 84155 ASSAY OF PROTEIN SERUM: CPT

## 2019-03-22 PROCEDURE — 82040 ASSAY OF SERUM ALBUMIN: CPT

## 2019-03-22 PROCEDURE — 84520 ASSAY OF UREA NITROGEN: CPT

## 2019-03-22 PROCEDURE — 82435 ASSAY OF BLOOD CHLORIDE: CPT

## 2019-03-22 PROCEDURE — 84460 ALANINE AMINO (ALT) (SGPT): CPT

## 2019-03-22 PROCEDURE — 82565 ASSAY OF CREATININE: CPT

## 2019-03-22 PROCEDURE — 83735 ASSAY OF MAGNESIUM: CPT

## 2019-03-22 PROCEDURE — 84075 ASSAY ALKALINE PHOSPHATASE: CPT

## 2019-03-22 PROCEDURE — 82247 BILIRUBIN TOTAL: CPT

## 2019-03-22 PROCEDURE — 84295 ASSAY OF SERUM SODIUM: CPT

## 2019-03-22 PROCEDURE — 82374 ASSAY BLOOD CARBON DIOXIDE: CPT

## 2019-03-22 PROCEDURE — 84450 TRANSFERASE (AST) (SGOT): CPT

## 2019-03-22 PROCEDURE — 82947 ASSAY GLUCOSE BLOOD QUANT: CPT

## 2019-03-22 PROCEDURE — 82310 ASSAY OF CALCIUM: CPT

## 2019-03-22 PROCEDURE — 84132 ASSAY OF SERUM POTASSIUM: CPT

## 2019-03-22 PROCEDURE — 84100 ASSAY OF PHOSPHORUS: CPT

## 2019-03-22 PROCEDURE — 36415 COLL VENOUS BLD VENIPUNCTURE: CPT

## 2019-04-19 VITALS — DIASTOLIC BLOOD PRESSURE: 62 MMHG | SYSTOLIC BLOOD PRESSURE: 100 MMHG

## 2019-04-19 LAB — PLATELET COUNT, AUTOMATED: 142 K/UL (ref 150–450)

## 2019-07-12 ENCOUNTER — HOSPITAL ENCOUNTER (OUTPATIENT)
Dept: HOSPITAL 89 - SPU | Age: 69
LOS: 5 days | End: 2019-07-17
Attending: INTERNAL MEDICINE
Payer: MEDICARE

## 2019-07-12 VITALS — SYSTOLIC BLOOD PRESSURE: 90 MMHG | DIASTOLIC BLOOD PRESSURE: 60 MMHG

## 2019-07-12 DIAGNOSIS — C79.51: ICD-10-CM

## 2019-07-12 DIAGNOSIS — Z17.0: ICD-10-CM

## 2019-07-12 DIAGNOSIS — C50.411: Primary | ICD-10-CM

## 2019-07-12 DIAGNOSIS — E83.42: ICD-10-CM

## 2019-07-12 LAB — PLATELET COUNT, AUTOMATED: 123 K/UL (ref 150–450)

## 2019-07-12 PROCEDURE — 82040 ASSAY OF SERUM ALBUMIN: CPT

## 2019-07-12 PROCEDURE — 36415 COLL VENOUS BLD VENIPUNCTURE: CPT

## 2019-07-12 PROCEDURE — 84100 ASSAY OF PHOSPHORUS: CPT

## 2019-07-12 PROCEDURE — 84450 TRANSFERASE (AST) (SGOT): CPT

## 2019-07-12 PROCEDURE — 82310 ASSAY OF CALCIUM: CPT

## 2019-07-12 PROCEDURE — 84155 ASSAY OF PROTEIN SERUM: CPT

## 2019-07-12 PROCEDURE — 82374 ASSAY BLOOD CARBON DIOXIDE: CPT

## 2019-07-12 PROCEDURE — 84075 ASSAY ALKALINE PHOSPHATASE: CPT

## 2019-07-12 PROCEDURE — 82247 BILIRUBIN TOTAL: CPT

## 2019-07-12 PROCEDURE — 84460 ALANINE AMINO (ALT) (SGPT): CPT

## 2019-07-12 PROCEDURE — 83735 ASSAY OF MAGNESIUM: CPT

## 2019-07-12 PROCEDURE — 84295 ASSAY OF SERUM SODIUM: CPT

## 2019-07-12 PROCEDURE — 84520 ASSAY OF UREA NITROGEN: CPT

## 2019-07-12 PROCEDURE — 82435 ASSAY OF BLOOD CHLORIDE: CPT

## 2019-07-12 PROCEDURE — 84132 ASSAY OF SERUM POTASSIUM: CPT

## 2019-07-12 PROCEDURE — 82947 ASSAY GLUCOSE BLOOD QUANT: CPT

## 2019-07-12 PROCEDURE — 85025 COMPLETE CBC W/AUTO DIFF WBC: CPT

## 2019-07-12 PROCEDURE — 82565 ASSAY OF CREATININE: CPT

## 2020-09-17 ENCOUNTER — APPOINTMENT (RX ONLY)
Dept: URBAN - METROPOLITAN AREA CLINIC 310 | Facility: CLINIC | Age: 70
Setting detail: DERMATOLOGY
End: 2020-09-17

## 2020-09-17 DIAGNOSIS — L82.1 OTHER SEBORRHEIC KERATOSIS: ICD-10-CM

## 2020-09-17 DIAGNOSIS — H01.13 ECZEMATOUS DERMATITIS OF EYELID: ICD-10-CM

## 2020-09-17 DIAGNOSIS — L82.0 INFLAMED SEBORRHEIC KERATOSIS: ICD-10-CM

## 2020-09-17 DIAGNOSIS — L57.0 ACTINIC KERATOSIS: ICD-10-CM

## 2020-09-17 DIAGNOSIS — L81.4 OTHER MELANIN HYPERPIGMENTATION: ICD-10-CM

## 2020-09-17 DIAGNOSIS — D485 NEOPLASM OF UNCERTAIN BEHAVIOR OF SKIN: ICD-10-CM

## 2020-09-17 DIAGNOSIS — D22 MELANOCYTIC NEVI: ICD-10-CM

## 2020-09-17 DIAGNOSIS — D18.0 HEMANGIOMA: ICD-10-CM

## 2020-09-17 PROBLEM — D48.5 NEOPLASM OF UNCERTAIN BEHAVIOR OF SKIN: Status: ACTIVE | Noted: 2020-09-17

## 2020-09-17 PROBLEM — D22.5 MELANOCYTIC NEVI OF TRUNK: Status: ACTIVE | Noted: 2020-09-17

## 2020-09-17 PROBLEM — H01.139 ECZEMATOUS DERMATITIS OF UNSPECIFIED EYE, UNSPECIFIED EYELID: Status: ACTIVE | Noted: 2020-09-17

## 2020-09-17 PROBLEM — D18.01 HEMANGIOMA OF SKIN AND SUBCUTANEOUS TISSUE: Status: ACTIVE | Noted: 2020-09-17

## 2020-09-17 PROCEDURE — ? LIQUID NITROGEN

## 2020-09-17 PROCEDURE — 17003 DESTRUCT PREMALG LES 2-14: CPT | Mod: 59

## 2020-09-17 PROCEDURE — 17000 DESTRUCT PREMALG LESION: CPT | Mod: 59

## 2020-09-17 PROCEDURE — ? COUNSELING

## 2020-09-17 PROCEDURE — 69100 BIOPSY OF EXTERNAL EAR: CPT | Mod: 59

## 2020-09-17 PROCEDURE — 99203 OFFICE O/P NEW LOW 30 MIN: CPT | Mod: 25

## 2020-09-17 PROCEDURE — 17110 DESTRUCTION B9 LES UP TO 14: CPT

## 2020-09-17 PROCEDURE — ? SUNSCREEN RECOMMENDATIONS

## 2020-09-17 PROCEDURE — ? BIOPSY BY SHAVE METHOD

## 2020-09-17 PROCEDURE — ? PRESCRIPTION

## 2020-09-17 RX ORDER — TACROLIMUS 1 MG/G
OINTMENT TOPICAL QD
Qty: 1 | Refills: 6 | Status: ERX | COMMUNITY
Start: 2020-09-17

## 2020-09-17 RX ADMIN — TACROLIMUS: 1 OINTMENT TOPICAL at 00:00

## 2020-09-17 ASSESSMENT — LOCATION DETAILED DESCRIPTION DERM
LOCATION DETAILED: RIGHT UPPER CUTANEOUS LIP
LOCATION DETAILED: LEFT ANTERIOR PROXIMAL THIGH
LOCATION DETAILED: LEFT INFERIOR UPPER BACK
LOCATION DETAILED: RIGHT INFERIOR UPPER BACK
LOCATION DETAILED: LEFT MEDIAL UPPER BACK
LOCATION DETAILED: LEFT UPPER CUTANEOUS LIP
LOCATION DETAILED: RIGHT MEDIAL UPPER BACK
LOCATION DETAILED: LEFT INFERIOR CRUS OF ANTIHELIX

## 2020-09-17 ASSESSMENT — LOCATION SIMPLE DESCRIPTION DERM
LOCATION SIMPLE: LEFT LIP
LOCATION SIMPLE: RIGHT LIP
LOCATION SIMPLE: LEFT THIGH
LOCATION SIMPLE: RIGHT UPPER BACK
LOCATION SIMPLE: LEFT EAR
LOCATION SIMPLE: LEFT UPPER BACK

## 2020-09-17 ASSESSMENT — LOCATION ZONE DERM
LOCATION ZONE: LEG
LOCATION ZONE: LIP
LOCATION ZONE: TRUNK
LOCATION ZONE: EAR

## 2020-09-17 NOTE — PROCEDURE: COUNSELING
Patient Specific Counseling (Will Not Stick From Patient To Patient): Discussed patch testing. She is leaving for [place]. Will be there about 3 months. May consider when she returns. Patient Specific Counseling (Will Not Stick From Patient To Patient): Discussed patch testing. She is leaving for [place]. Will be there about 3 months. May consider when she returns.

## 2020-09-17 NOTE — HPI: FULL BODY SKIN EXAMINATION
What Type Of Note Output Would You Prefer (Optional)?: Standard Output
What Is The Reason For Today's Visit?: Full Body Skin Examination
What Is The Reason For Today's Visit? (Being Monitored For X): concerning skin lesions on an annual basis
How Severe Are Your Spot(S)?: mild
Additional History: She has several spots throughout her body that she has noticed. She has a spot on her left ear that is crusty and slightly tender along with a spot inside of her ear that has been bx recently by an ENT in [place]. The bx came back just yesterday as superficially sampled. She has other raised scaly spots on her upper lip and leg. She has severe itching in her eyebrows and around both of her eyes. She has tried elidel and tacrolimus which only help so much. Comes and goes. Not present today.